# Patient Record
Sex: FEMALE | Race: WHITE | Employment: FULL TIME | URBAN - METROPOLITAN AREA
[De-identification: names, ages, dates, MRNs, and addresses within clinical notes are randomized per-mention and may not be internally consistent; named-entity substitution may affect disease eponyms.]

---

## 2017-03-10 ENCOUNTER — OFFICE VISIT (OUTPATIENT)
Dept: SLEEP MEDICINE | Facility: CLINIC | Age: 42
End: 2017-03-10
Attending: NURSE PRACTITIONER
Payer: COMMERCIAL

## 2017-03-10 VITALS
HEIGHT: 64 IN | WEIGHT: 259.3 LBS | OXYGEN SATURATION: 98 % | SYSTOLIC BLOOD PRESSURE: 176 MMHG | DIASTOLIC BLOOD PRESSURE: 112 MMHG | HEART RATE: 102 BPM | RESPIRATION RATE: 18 BRPM | BODY MASS INDEX: 44.27 KG/M2

## 2017-03-10 DIAGNOSIS — I10 ESSENTIAL HYPERTENSION: ICD-10-CM

## 2017-03-10 DIAGNOSIS — G47.33 OSA (OBSTRUCTIVE SLEEP APNEA): Primary | ICD-10-CM

## 2017-03-10 DIAGNOSIS — G47.21 CIRCADIAN RHYTHM SLEEP DISORDER, DELAYED SLEEP PHASE TYPE: ICD-10-CM

## 2017-03-10 DIAGNOSIS — E66.9 OBESITY (BMI 30-39.9): ICD-10-CM

## 2017-03-10 PROCEDURE — 99211 OFF/OP EST MAY X REQ PHY/QHP: CPT | Mod: ZF

## 2017-03-10 RX ORDER — FLUTICASONE PROPIONATE 50 MCG
2 SPRAY, SUSPENSION (ML) NASAL DAILY
Qty: 48 G | Refills: 3 | Status: SHIPPED | OUTPATIENT
Start: 2017-03-10

## 2017-03-10 NOTE — MR AVS SNAPSHOT
After Visit Summary   3/10/2017    Nohemi Dial    MRN: [id number]           Patient Information     Date Of Birth          1975        Visit Information        Provider Department      3/10/2017 10:00 AM Sona Ramirez APRN [facility], [place], Sleep Study        Today's Diagnoses     BELEN (obstructive sleep apnea)    -  1    Essential hypertension        Obesity (BMI 30-39.9)        Circadian rhythm sleep disorder, delayed sleep phase type          Care Instructions    Supplies and mask fit    Use saline spray product (ocean complete or arm and hammer are easy to use) in shower where nose naturally opens up. Use another time of day with continue congestion over the sink.   flonase 2 sprays/nostril/ day  (gael Gibbs)  Until you are seen again in clinic in the next 12 months, please watch for a return of sleepiness which is usually due to a change in the followin) a decrease in usage of cpap  2) decrease in amount of time slept  3) a poor seal (often a function of not following cleaning recommendations or  replacement guidelines (every 3 -6 months))  4)  increase in weight or use of sedating medications resulting in higher pressure needs     If you have tried to address these issues but are still sleepy, please call for an earlier appointment.    Please, do not drive if sleepy        Your BMI is Body mass index is 44.51 kg/(m^2). lifestyle weight management  Weight management is a personal decision.  If you are interested in exploring weight loss strategies, the following discussion covers the approaches that may be successful. Body mass index (BMI) is one way to tell whether you are at a healthy weight, overweight, or obese. It measures your weight in relation to your height.  A BMI of 18.5 to 24.9 is in the healthy range. A person with a BMI of 25 to 29.9 is considered overweight, and someone with a BMI of 30 or greater is considered obese. More than two-thirds of American  adults are considered overweight or obese.  Being overweight or obese increases the risk for further weight gain. Excess weight may lead to heart disease and diabetes.  Creating and following plans for healthy eating and physical activity may help you improve your health.  Weight control is part of healthy lifestyle and includes exercise, emotional health, and healthy eating habits. Careful eating habits lifelong are the mainstay of weight control. Though there are significant health benefits from weight loss, long-term weight loss with diet alone may be very difficult to achieve- studies show long-term success with dietary management in less than 10% of people. Attaining a healthy weight may be especially difficult to achieve in those with severe obesity. In some cases, medications, devices and surgical management might be considered.  What can you do?  If you are overweight or obese and are interested in methods for weight loss, you should discuss this with your provider.     Consider reducing daily calorie intake by 500 calories.     Keep a food journal.     Avoiding skipping meals, consider cutting portions instead.    Diet combined with exercise helps maintain muscle while optimizing fat loss. Strength training is particularly important for building and maintaining muscle mass. Exercise helps reduce stress, increase energy, and improves fitness. Increasing exercise without diet control, however, may not burn enough calories to loose weight.       Start walking three days a week 10-20 minutes at a time    Work towards walking thirty minutes five days a week     Eventually, increase the speed of your walking for 1-2 minutes at time    In addition, we recommend that you review healthy lifestyles and methods for weight loss available through the National Institutes of Health patient information sites:  http://win.niddk.nih.gov/publications/index.htm    And look into health and wellness programs that may be  available through your health insurance provider, employer, local community center, or haydee club.    Weight management plan: Patient was referred to their PCP to discuss a diet and exercise plan.            Follow-ups after your visit        Additional Services     WEIGHT MANAGEMENT/ [facility] LIFESTYLE PROGRAM REFERRAL       To schedule an appointment, please call the [facility] at [phone].                  Follow-up notes from your care team     Return in about 1 year (around 3/10/2018) for mask fit and supplies with Gerry.      Your next 10 appointments already scheduled     Mar 14, 2017  9:00 AM CDT   SLEEP DME SUPPLIES with DME SCHEDULE   [facility])    [address] [place] [id number] [phone]              Who to contact     If you have questions or need follow up information about today's clinic visit or your schedule please contact [facility] directly at [phone].  Normal or non-critical lab and imaging results will be communicated to you by EUROBOXhart, letter or phone within 4 business days after the clinic has received the results. If you do not hear from us within 7 days, please contact the clinic through [employer] or phone. If you have a critical or abnormal lab result, we will notify you by phone as soon as possible.  Submit refill requests through Microland or call your pharmacy and they will forward the refill request to us. Please allow 3 business days for your refill to be completed.          Additional Information About Your Visit        EUROBOXharEyesquad Information     Microland gives you secure access to your electronic health record. If you see a primary care provider, you can also send messages to your care team and make appointments. If you have questions, please call your primary care clinic.  If you do not have a primary care provider, please call [phone] and they will  "assist you.        Care EveryWhere ID     This is your Care EveryWhere ID. This could be used by other organizations to access your [place] medical records  [id number]        Your Vitals Were     Pulse Respirations Height Last Period Pulse Oximetry BMI (Body Mass Index)    102 18 1.626 m (5' 4\") 02/14/2017 98% 44.51 kg/m2       Blood Pressure from Last 3 Encounters:   03/10/17 (!) 176/112   06/03/15 120/78   10/10/14 132/84    Weight from Last 3 Encounters:   03/10/17 117.6 kg (259 lb 4.8 oz)   06/03/15 117.3 kg (258 lb 9.6 oz)   10/10/14 115.8 kg (255 lb 3.2 oz)              We Performed the Following     [facility]     WEIGHT MANAGEMENT/ [facility] LIFESTYLE PROGRAM REFERRAL          Today's Medication Changes          These changes are accurate as of: 3/10/17  6:42 PM.  If you have any questions, ask your nurse or doctor.               These medicines have changed or have updated prescriptions.        Dose/Directions    fluticasone 50 MCG/ACT spray   Commonly known as:  FLONASE   This may have changed:  how much to take   Changed by:  Sona Ramirez APRN CNP        Dose:  2 spray   Spray 2 sprays into both nostrils daily   Quantity:  48 g   Refills:  3            Where to get your medicines      These medications were sent to [facility] [address] [place] [address] AT [address] [place] [address] & [address] [address]    Hours:  24-hours Phone:  [phone]     fluticasone 50 MCG/ACT spray                Primary Care Provider Office Phone # Fax #    ARELY Greco [phone]       [facility] [address] [facility] [address]        Thank you!     Thank you for choosing [facility]  for your care. Our goal is always to provide you with excellent care. Hearing back from our patients is one way we can continue to improve our services. Please take a few minutes to complete the written survey " that you may receive in the mail after your visit with us. Thank you!             Your Updated Medication List - Protect others around you: Learn how to safely use, store and throw away your medicines at www.disposemymeds.org.          This list is accurate as of: 3/10/17  6:42 PM.  Always use your most recent med list.                   Brand Name Dispense Instructions for use    fluticasone 50 MCG/ACT spray    FLONASE    48 g    Spray 2 sprays into both nostrils daily       NASAL SPRAY NA

## 2017-03-10 NOTE — NURSING NOTE
"Chief Complaint   Patient presents with     Consult     Reestablish care       Initial BP (!) 179/127 (BP Location: Right arm, Patient Position: Chair, Cuff Size: Adult Large)  Pulse 102  Resp 18  Ht 1.626 m (5' 4\")  Wt 117.6 kg (259 lb 4.8 oz)  LMP 02/14/2017  SpO2 98%  BMI 44.51 kg/m2 Estimated body mass index is 44.51 kg/(m^2) as calculated from the following:    Height as of this encounter: 1.626 m (5' 4\").    Weight as of this encounter: 117.6 kg (259 lb 4.8 oz).  Medication Reconciliation: complete   Neck 41cm    Rolando HINTON      "

## 2017-03-10 NOTE — PATIENT INSTRUCTIONS
Supplies and mask fit    Use saline spray product (ocean complete or arm and hammer are easy to use) in shower where nose naturally opens up. Use another time of day with continue congestion over the sink.   flonase 2 sprays/nostril/ day  (gael Gibbs)  Until you are seen again in clinic in the next 12 months, please watch for a return of sleepiness which is usually due to a change in the followin) a decrease in usage of cpap  2) decrease in amount of time slept  3) a poor seal (often a function of not following cleaning recommendations or  replacement guidelines (every 3 -6 months))  4)  increase in weight or use of sedating medications resulting in higher pressure needs     If you have tried to address these issues but are still sleepy, please call for an earlier appointment.    Please, do not drive if sleepy        Your BMI is Body mass index is 44.51 kg/(m^2). lifestyle weight management  Weight management is a personal decision.  If you are interested in exploring weight loss strategies, the following discussion covers the approaches that may be successful. Body mass index (BMI) is one way to tell whether you are at a healthy weight, overweight, or obese. It measures your weight in relation to your height.  A BMI of 18.5 to 24.9 is in the healthy range. A person with a BMI of 25 to 29.9 is considered overweight, and someone with a BMI of 30 or greater is considered obese. More than two-thirds of American adults are considered overweight or obese.  Being overweight or obese increases the risk for further weight gain. Excess weight may lead to heart disease and diabetes.  Creating and following plans for healthy eating and physical activity may help you improve your health.  Weight control is part of healthy lifestyle and includes exercise, emotional health, and healthy eating habits. Careful eating habits lifelong are the mainstay of weight control. Though there are significant health benefits from  weight loss, long-term weight loss with diet alone may be very difficult to achieve- studies show long-term success with dietary management in less than 10% of people. Attaining a healthy weight may be especially difficult to achieve in those with severe obesity. In some cases, medications, devices and surgical management might be considered.  What can you do?  If you are overweight or obese and are interested in methods for weight loss, you should discuss this with your provider.     Consider reducing daily calorie intake by 500 calories.     Keep a food journal.     Avoiding skipping meals, consider cutting portions instead.    Diet combined with exercise helps maintain muscle while optimizing fat loss. Strength training is particularly important for building and maintaining muscle mass. Exercise helps reduce stress, increase energy, and improves fitness. Increasing exercise without diet control, however, may not burn enough calories to loose weight.       Start walking three days a week 10-20 minutes at a time    Work towards walking thirty minutes five days a week     Eventually, increase the speed of your walking for 1-2 minutes at time    In addition, we recommend that you review healthy lifestyles and methods for weight loss available through the National Institutes of Health patient information sites:  http://win.niddk.nih.gov/publications/index.htm    And look into health and wellness programs that may be available through your health insurance provider, employer, local community center, or haydee club.    Weight management plan: Patient was referred to their PCP to discuss a diet and exercise plan.

## 2017-03-11 NOTE — PROGRESS NOTES
"Allergies:    Allergies   Allergen Reactions     No Known Drug Allergies        Medications:    Current Outpatient Prescriptions   Medication Sig Dispense Refill     fluticasone (FLONASE) 50 MCG/ACT nasal spray Spray 1-2 sprays into both nostrils daily 1 Package 11     Oxymetazoline HCl (NASAL SPRAY NA)          Problem List:  Patient Active Problem List    Diagnosis Date Noted     BELEN (obstructive sleep apnea) 04/25/2013     CPAP       Chronic blood loss anemia 02/15/2013     CARDIOVASCULAR SCREENING; LDL GOAL LESS THAN 160 02/10/2010     Uterine myoma 12/11/2008     Anxiety state 02/15/2005     Problem list name updated by automated process. Provider to review          Past Medical/Surgical History:  Past Medical History   Diagnosis Date     Heavy periods      NO ACTIVE PROBLEMS      Sleep apnea      CPAP     Past Surgical History   Procedure Laterality Date     C nonspecific procedure       Partial Amputation right thumb     Operative hysteroscopy with morcellator  1/14/2014     Procedure: OPERATIVE HYSTEROSCOPY WITH MORCELLATOR;  Dilation and Curettage Operative Hysteroscopy with Myosure, Placement of Intra Uterine Device .pelvic exam under anesthesia;  Surgeon: Claire Agustin MD;  Location: [facility] OR     Insert intrauterine device  1/14/2014     Procedure: INSERT INTRAUTERINE DEVICE;;  Surgeon: Claire Agustin MD;  Location: [facility] OR           Physical Examination:  Vitals: BP (!) 176/112 (BP Location: Right arm, Patient Position: Chair, Cuff Size: Adult Large)  Pulse 102  Resp 18  Ht 1.626 m (5' 4\")  Wt 117.6 kg (259 lb 4.8 oz)  LMP 02/14/2017  SpO2 98%  BMI 44.51 kg/m2  BMI= Body mass index is 44.51 kg/(m^2).    Neck Cir (cm): 41 cm    [place] Total Score 4/9/2013   Total score - [place] 16       GENERAL APPEARANCE: healthy, alert and no distress  EYES: Eyes grossly normal to inspection  HENT: oropharynx crowded, uvula elongated, soft palate dependent and nares with mild septal deviation, decrease " flow on R  NECK: thyroid normal to palpation  RESP: lungs clear to auscultation - no rales, rhonchi or wheezes  CV: regular rates and rhythm, normal S1 S2, no S3 or S4 and no murmur, click or rub  Extremities are without clubbing, edema  Musculoskeletal:Moves without difficulty  Mallampati Class: IV.  Tonsillar Stage: 1  hidden by pillars.  Dental: Dentition in good condition.  Good advancement of lower jaw without tmd  Skin: without facial rash

## 2017-03-11 NOTE — PROGRESS NOTES
SLEEP MEDICINE CLINIC       DATE OF SLEEP CLINIC VISIT:  03/10/2017.       LOCATION:  [facility].        CHIEF COMPLAINT:  Ms. Nohemi Dial self-refers for a need for new CPAP prescription and supplies after an absence in our clinic for approximately 4 years due to a change of insurance.      HISTORY OF PRESENT ILLNESS:  Previous sleep study done in 2013 which did show preliminary data of AHI of 10 with low O2 sats down to 64% in REM supine sleep, TCM with only minimal rise.  She was provided with APAP and did use that regularly.  Her [place] Sleepiness Score at baseline was 14/24 and she had some difficulties both with sleep onset and sleep maintenance.  She returns to clinic today.  We are able to download 1 day of use in 2016 going back between 08/06 and 09/04/2016, she used her CPAP then for 4 hours and 38 minutes.  Her residual AHI was 0.9 and the 90th percentile pressure was 10.5.  Her CPAP is set at a minimum EPAP of 7, max of 16 cm of water.      Her sleep schedule is fairly regular with a small degree of disparity between workdays and weekends.  She is a night owl by nature and on weekends bedtime is somewhere between 12:00 and 2:00 a.m. and exiting bed at about 9:00 a.m.  On workdays enters bed at 11:00 p.m., exits at around 7:00 a.m. but sometimes later.  Her work start as early as 10:00 a.m. on nongame days for the [employer] and on game days, she can start work at 2:00 p.m. and go to 1:00 in the morning.  She currently without her CPAP awakens 4-5 times a night, mostly to use the bathroom.  Total sleep time 7-8 hours.  She is napping twice on weekends for about 2-3 hours in the afternoon.  Activities in bed includes some brief reading or computer use.  No RLS, nightmares, headaches or signs of orexin deficiency or difficulty with driving.  She does grind her teeth at times.  Without her CPAP, there is loud snoring, snort or gasping arousals and pauses in her sleep period.   Awakens with a dry throat or mouth and on occasion has problems breathing through her nose.  With CPAP, none of these symptoms are present.      SOCIAL AND PERSONAL HISTORY:  She is single.      SLEEP ENVIRONMENT:  Bed partner snores a bit.  She awakens by her own snoring and then lives on a noisy corner in [facility] [facility].  Family members do have sleep apnea.  May have a glass of wine with dinner, otherwise no significant alcohol, caffeine.      [place] Sleepiness score today is 10/24.  She does report sleepiness has affected her productivity in the afternoons when she is exceptionally tired.  No problems with driving.  Three out of 7 days per week she is troubled by tiredness or fatigue.  Two out of 30 days, low mood.  Her father is going through treatment for cancer.        REVIEW OF SYSTEMS:  A 14-point comprehensive review of systems was completed and negative with the exception of the following:   EAR, NOSE AND THROAT:  Sore throat.  Has untreated sleep apnea on her mind.   NERVOUS SYSTEM:  Occasional headaches.   LUNGS:  Dry cough in the morning due to mucous drying overnight.       Allergies:    Allergies   Allergen Reactions     No Known Drug Allergies        Medications:    Current Outpatient Prescriptions   Medication Sig Dispense Refill     fluticasone (FLONASE) 50 MCG/ACT nasal spray Spray 1-2 sprays into both nostrils daily 1 Package 11     Oxymetazoline HCl (NASAL SPRAY NA)          Problem List:  Patient Active Problem List    Diagnosis Date Noted     BELEN (obstructive sleep apnea) 04/25/2013     CPAP       Chronic blood loss anemia 02/15/2013     CARDIOVASCULAR SCREENING; LDL GOAL LESS THAN 160 02/10/2010     Uterine myoma 12/11/2008     Anxiety state 02/15/2005     Problem list name updated by automated process. Provider to review          Past Medical/Surgical History:  Past Medical History   Diagnosis Date     Heavy periods      NO ACTIVE PROBLEMS      Sleep apnea      CPAP     Past Surgical History  "  Procedure Laterality Date     C nonspecific procedure       Partial Amputation right thumb     Operative hysteroscopy with morcellator  1/14/2014     Procedure: OPERATIVE HYSTEROSCOPY WITH MORCELLATOR;  Dilation and Curettage Operative Hysteroscopy with Myosure, Placement of Intra Uterine Device .pelvic exam under anesthesia;  Surgeon: Claire Agustin MD;  Location: [facility] OR     Insert intrauterine device  1/14/2014     Procedure: INSERT INTRAUTERINE DEVICE;;  Surgeon: Claire Agustin MD;  Location: [facility] OR     Physical Examination:  Vitals: BP (!) 176/112 (BP Location: Right arm, Patient Position: Chair, Cuff Size: Adult Large)  Pulse 102  Resp 18  Ht 1.626 m (5' 4\")  Wt 117.6 kg (259 lb 4.8 oz)  LMP 02/14/2017  SpO2 98%  BMI 44.51 kg/m2  BMI= Body mass index is 44.51 kg/(m^2).    Neck Cir (cm): 41 cm    [place] Total Score 4/9/2013   Total score - [place] 16       GENERAL APPEARANCE: healthy, alert and no distress  EYES: Eyes grossly normal to inspection  HENT: oropharynx crowded, uvula elongated, soft palate dependent and nares with mild septal deviation, decrease flow on R  NECK: thyroid normal to palpation  RESP: lungs clear to auscultation - no rales, rhonchi or wheezes  CV: regular rates and rhythm, normal S1 S2, no S3 or S4 and no murmur, click or rub  Extremities are without clubbing, edema  Musculoskeletal:Moves without difficulty  Mallampati Class: IV.  Tonsillar Stage: 1  hidden by pillars.  Dental: Dentition in good condition.  Good advancement of lower jaw without tmd  Skin: without facial rash     ASSESSMENT AND PLAN:  It is my impression that Ms. Nohemi Dial is very motivated in improving her sleep and the following plan of care has been developed:     1.  Obstructive sleep apnea.  I have written an order for her to have supplies provided and I believe a mask fit.  She is to see me back again in 1 year's time, sooner with any problems.   2.  Hypertension.  I have notified her primary " care.  She is aware that it has been gradually going up but the results today are quite disturbing.   3.  Obesity.  Likely impacting her obstructive sleep apnea and Lifestyle Weight Management was offered and referral was placed.   4.  Delayed sleep phase disorder.  Her current position with its dual start time does seem to fit in nicely with her circadian rhythm.  It is important, however, that she protects her a.m. time off and I will reinforce that when I see her next.   5.  Rhinitis.  I have recommended treatment for that.      Thank you for allowing me to participate in this kind woman's care.      Time spent with patient 60 minutes, of which greater than 50% was spent in counseling, education and coordination of care.         ARELY FERNANDES, CNP             D: 2017 11:01   T: 2017 15:30   MT: [id number]      Name:     AGUSTINA ELIZABETH   MRN:      [id number]        Account:      [id number]   :      1975           Visit Date:   03/10/2017      Document: [id number]

## 2017-03-14 ENCOUNTER — DOCUMENTATION ONLY (OUTPATIENT)
Dept: SLEEP MEDICINE | Facility: CLINIC | Age: 42
End: 2017-03-14
Attending: NURSE PRACTITIONER
Payer: COMMERCIAL

## 2017-03-31 ENCOUNTER — OFFICE VISIT (OUTPATIENT)
Dept: FAMILY MEDICINE | Facility: CLINIC | Age: 42
End: 2017-03-31
Payer: COMMERCIAL

## 2017-03-31 VITALS
TEMPERATURE: 97.9 F | OXYGEN SATURATION: 99 % | SYSTOLIC BLOOD PRESSURE: 160 MMHG | HEIGHT: 64 IN | DIASTOLIC BLOOD PRESSURE: 98 MMHG | HEART RATE: 102 BPM | WEIGHT: 264.4 LBS | RESPIRATION RATE: 16 BRPM | BODY MASS INDEX: 45.14 KG/M2

## 2017-03-31 DIAGNOSIS — E66.01 MORBID OBESITY (H): ICD-10-CM

## 2017-03-31 DIAGNOSIS — R03.0 ELEVATED BLOOD PRESSURE READING WITHOUT DIAGNOSIS OF HYPERTENSION: Primary | ICD-10-CM

## 2017-03-31 PROCEDURE — 80053 COMPREHEN METABOLIC PANEL: CPT | Performed by: NURSE PRACTITIONER

## 2017-03-31 PROCEDURE — 80061 LIPID PANEL: CPT | Performed by: NURSE PRACTITIONER

## 2017-03-31 PROCEDURE — 84443 ASSAY THYROID STIM HORMONE: CPT | Performed by: NURSE PRACTITIONER

## 2017-03-31 PROCEDURE — 99213 OFFICE O/P EST LOW 20 MIN: CPT | Performed by: NURSE PRACTITIONER

## 2017-03-31 PROCEDURE — 36415 COLL VENOUS BLD VENIPUNCTURE: CPT | Performed by: NURSE PRACTITIONER

## 2017-03-31 NOTE — PROGRESS NOTES
SUBJECTIVE:                                                    Nohemi Dial is a 42 year old female who presents to clinic today for the following health issues:    Hypertension Follow-up    Outpatient blood pressures are not being checked.    Low Salt Diet: No added salt    She denies any current chest pain, headaches, lightheadedness, or shortness of breath. She would prefer to avoid medications at this time and continue monitoring it every 6 months, indicating she will try to exercise more often and watch her diet more closely. When asked, she has been drinking more alcohol since the 1st of the year, but only 4-5 drinks per week, which mostly consists of wine.  BP Readings from Last 3 Encounters:   03/31/17 (!) 160/98   03/10/17 (!) 176/112   06/03/15 120/78           Amount of exercise or physical activity: Moderate     Problems taking medications regularly: Not prescribed hypertension medication.     Medication side effects: Not applicable    Diet: Regular (no restrictions)      Wt Readings from Last 4 Encounters:   03/31/17 264 lb 6.4 oz (119.9 kg)   03/10/17 259 lb 4.8 oz (117.6 kg)   06/03/15 258 lb 9.6 oz (117.3 kg)   10/10/14 255 lb 3.2 oz (115.8 kg)       Sleep Apnea:  The patient reports she was not using her CPAP machine for the past year and a half due to insurance coverage issues, but started using it again one week and a half ago.      Problem list and histories reviewed & adjusted, as indicated.  Additional history: as documented    Labs reviewed in EPIC    Reviewed and updated as needed this visit by clinical staff  Tobacco  Allergies  Meds  Problems  Med Hx  Surg Hx  Fam Hx  Soc Hx        Reviewed and updated as needed this visit by Provider  Allergies  Meds  Problems         Patient Active Problem List   Diagnosis     Anxiety state     Uterine myoma     CARDIOVASCULAR SCREENING; LDL GOAL LESS THAN 160     Chronic blood loss anemia     BELEN (obstructive sleep apnea)     Morbid  obesity (H)     Past Surgical History:   Procedure Laterality Date     C NONSPECIFIC PROCEDURE      Partial Amputation right thumb     INSERT INTRAUTERINE DEVICE  1/14/2014    Procedure: INSERT INTRAUTERINE DEVICE;;  Surgeon: Claire Agustin MD;  Location: [facility] OR     OPERATIVE HYSTEROSCOPY WITH MORCELLATOR  1/14/2014    Procedure: OPERATIVE HYSTEROSCOPY WITH MORCELLATOR;  Dilation and Curettage Operative Hysteroscopy with Myosure, Placement of Intra Uterine Device .pelvic exam under anesthesia;  Surgeon: Claire Agustin MD;  Location: [facility] OR       Social History   Substance Use Topics     Smoking status: Former Smoker     Types: Cigarettes     Quit date: 1/9/2000     Smokeless tobacco: Never Used     Alcohol use 3.0 oz/week     6 Standard drinks or equivalent per week      Comment: 3/day on wknds     Family History   Problem Relation Age of Onset     Gynecology Mother      Fibroids, hysterectomy     DIABETES Mother      CANCER Father      Non-hodgkins lymphoma/kidney     CANCER Paternal Grandfather      Blood clots     Neurologic Disorder Paternal Grandfather      Parkinsons     HEART DISEASE Sister      Mitral Valve Prolapse         REVIEW OF SYSTEMS:  C: NEGATIVE for fever, chills, or change in weight  R: Hx of Obstructive Sleep Apnea - use of CPAP Machine; NEGATIVE for significant cough or SOB  CV: POSITIVE for Hypertension; NEGATIVE for chest pain, palpitations or peripheral edema  P: Hx of Anxiety; NEGATIVE for changes in mood or affect    This document serves as a record of the services and decisions personally performed and made by Elsa Peacock NP. It was created on her behalf by Tanisha Ybarra, a trained medical scribe. The creation of this document is based on the provider's statements to the medical scribe.  Tanisha Ybarra, March 24, 2017, 3:24 PM      OBJECTIVE:                                                    BP (!) 160/98 (BP Location: Right arm, Patient Position: Chair, Cuff Size:  "Adult Large)  Pulse 102  Temp 97.9  F (36.6  C) (Oral)  Resp 16  Ht 5' 4\" (1.626 m)  Wt 264 lb 6.4 oz (119.9 kg)  LMP 02/14/2017  SpO2 99%  BMI 45.38 kg/m2  Body mass index is 45.38 kg/(m^2).    EXAM:  GENERAL: Patient appears healthy, alert and not distressed.  PSYCH: Mentation appears normal, affect is normal/bright  Remainder of exam deferred.   Lungs clear bilaterally.  S1 S2 without murmur gallop or rub      Diagnostic Test Results:  No results found for this or any previous visit (from the past 24 hour(s)).      ASSESSMENT/PLAN:                                                        ICD-10-CM    1. Elevated blood pressure reading without diagnosis of hypertension R03.0 Comprehensive metabolic panel     TSH with free T4 reflex     Lipid Profile   2. Morbid obesity (H) E66.01        Declines any medications.  Would like to control BP \"naturally\"  Would like watchful waiting for 6 months.  As there are no symptoms of end organ issues with BP can watch as pt restarts CPAP and works with diet and exercise.  Patient Instructions   LAB:  Stop by the lab on your way out to complete some blood work.    FOLLOW-UP:  Please schedule a follow-up appointment in 3 months to recheck your blood pressure and to review the results of your labs. Keeping active and monitoring your diet closely will help to naturally lower your blood pressure prior to our follow-up appointment.       The information in this document, created by a medical scribe for me, accurately reflects the services I personally performed and the decisions made by me. I have reviewed and approved this document for accuracy.  Elsa Peacock, March 31, 2017, 3:31 PM     [facility]  "

## 2017-03-31 NOTE — PATIENT INSTRUCTIONS
LAB:  Stop by the lab on your way out to complete some blood work.    FOLLOW-UP:  Please schedule a follow-up appointment in 3 months to recheck your blood pressure and to review the results of your labs. Keeping active and monitoring your diet closely will help to naturally lower your blood pressure prior to our follow-up appointment.

## 2017-03-31 NOTE — LETTER
April 17, 2017      Nohemi Dial  [address]  [facility] [address]        Nohemi,    Here are your labs.  Overall they are within normal limits.  Please let us know what questions you have.    Thuy Peacock

## 2017-03-31 NOTE — MR AVS SNAPSHOT
"              After Visit Summary   3/31/2017    Nohemi Dial    MRN: [id number]           Patient Information     Date Of Birth          1975        Visit Information        Provider Department      3/31/2017 2:20 PM Elsa Peacock APRN CNP [facility]        Today's Diagnoses     Elevated blood pressure reading without diagnosis of hypertension    -  1      Care Instructions    LAB:  Stop by the lab on your way out to complete some blood work.    FOLLOW-UP:  Please schedule a follow-up appointment in 3 months to recheck your blood pressure and to review the results of your labs. Keeping active and monitoring your diet closely will help to naturally lower your blood pressure prior to our follow-up appointment.           Follow-ups after your visit        Follow-up notes from your care team     Return in about 3 months (around 6/30/2017) for BP Recheck.      Who to contact     If you have questions or need follow up information about today's clinic visit or your schedule please contact [facility] directly at [phone].  Normal or non-critical lab and imaging results will be communicated to you by [employer], letter or phone within 4 business days after the clinic has received the results. If you do not hear from us within 7 days, please contact the clinic through [employer] or phone. If you have a critical or abnormal lab result, we will notify you by phone as soon as possible.  Submit refill requests through [employer] or call your pharmacy and they will forward the refill request to us. Please allow 3 business days for your refill to be completed.          Additional Information About Your Visit        [employer] retargetingharAirbnb Information     [employer] lets you send messages to your doctor, view your test results, renew your prescriptions, schedule appointments and more. To sign up, go to [email or web address] . Click on \"Log in\" on the left side of the screen, which will take you to the " "Welcome page. Then click on \"Sign up Now\" on the right side of the page.     You will be asked to enter the access code listed below, as well as some personal information. Please follow the directions to create your username and password.     Your access code is: [email or web address]  Expires: 2017  3:31 PM     Your access code will  in 90 days. If you need help or a new code, please call your [facility] clinic or [phone].        Care EveryWhere ID     This is your Care EveryWhere ID. This could be used by other organizations to access your [facility] medical records  [id number]        Your Vitals Were     Pulse Temperature Respirations Height Last Period Pulse Oximetry    102 97.9  F (36.6  C) (Oral) 16 5' 4\" (1.626 m) 2017 99%    BMI (Body Mass Index)                   45.38 kg/m2            Blood Pressure from Last 3 Encounters:   17 (!) 160/98   03/10/17 (!) 176/112   06/03/15 120/78    Weight from Last 3 Encounters:   17 264 lb 6.4 oz (119.9 kg)   03/10/17 259 lb 4.8 oz (117.6 kg)   06/03/15 258 lb 9.6 oz (117.3 kg)              We Performed the Following     Comprehensive metabolic panel     Lipid Profile     TSH with free T4 reflex        Primary Care Provider Office Phone # Fax #    Elsa Peacock APRDALJIT [phone]       [facility] [address] [facility] [address]        Thank you!     Thank you for choosing [facility]  for your care. Our goal is always to provide you with excellent care. Hearing back from our patients is one way we can continue to improve our services. Please take a few minutes to complete the written survey that you may receive in the mail after your visit with us. Thank you!             Your Updated Medication List - Protect others around you: Learn how to safely use, store and throw away your medicines at www.disposemymeds.org.          This list is accurate as of: 3/31/17  3:31 PM.  Always use your most " recent med list.                   Brand Name Dispense Instructions for use    fluticasone 50 MCG/ACT spray    FLONASE    48 g    Spray 2 sprays into both nostrils daily       NASAL SPRAY NA

## 2017-04-01 LAB
ALBUMIN SERPL-MCNC: 3.3 G/DL (ref 3.4–5)
ALP SERPL-CCNC: 48 U/L (ref 40–150)
ALT SERPL W P-5'-P-CCNC: 14 U/L (ref 0–50)
ANION GAP SERPL CALCULATED.3IONS-SCNC: 8 MMOL/L (ref 3–14)
AST SERPL W P-5'-P-CCNC: 9 U/L (ref 0–45)
BILIRUB SERPL-MCNC: 0.3 MG/DL (ref 0.2–1.3)
BUN SERPL-MCNC: 10 MG/DL (ref 7–30)
CALCIUM SERPL-MCNC: 8.6 MG/DL (ref 8.5–10.1)
CHLORIDE SERPL-SCNC: 106 MMOL/L (ref 94–109)
CHOLEST SERPL-MCNC: 148 MG/DL
CO2 SERPL-SCNC: 25 MMOL/L (ref 20–32)
CREAT SERPL-MCNC: 0.75 MG/DL (ref 0.52–1.04)
GFR SERPL CREATININE-BSD FRML MDRD: 86 ML/MIN/1.7M2
GLUCOSE SERPL-MCNC: 94 MG/DL (ref 70–99)
HDLC SERPL-MCNC: 46 MG/DL
LDLC SERPL CALC-MCNC: 68 MG/DL
NONHDLC SERPL-MCNC: 102 MG/DL
POTASSIUM SERPL-SCNC: 4.2 MMOL/L (ref 3.4–5.3)
PROT SERPL-MCNC: 6.6 G/DL (ref 6.8–8.8)
SODIUM SERPL-SCNC: 139 MMOL/L (ref 133–144)
TRIGL SERPL-MCNC: 169 MG/DL
TSH SERPL DL<=0.005 MIU/L-ACNC: 1.64 MU/L (ref 0.4–4)

## 2017-04-04 PROBLEM — E66.01 MORBID OBESITY (H): Status: ACTIVE | Noted: 2017-04-04

## 2017-05-03 ENCOUNTER — TELEPHONE (OUTPATIENT)
Dept: FAMILY MEDICINE | Facility: CLINIC | Age: 42
End: 2017-05-03

## 2017-05-03 NOTE — TELEPHONE ENCOUNTER
Panel Management Review      Patient has the following on her problem list: None      Composite cancer screening  Chart review shows that this patient is due/due soon for the following Pap Smear  Summary:    Patient is due/failing the following:   PAP and PHYSICAL    Action needed:   Patient needs office visit for physical and pap smear.    Type of outreach:    Phone, left message for patient to call back.     Questions for provider review:    None                                                                                                                                    Sejal Medeiros MA

## 2017-05-03 NOTE — LETTER
May 17, 2017      Nohemi Dial  [address]  [facility] [address]        Dear Nohemi,     According to our records, you are due for a physical and a pap smear to screen for cervical cancer.     Please contact the clinic at [phone] at your earliest convenience to schedule this appointment.     Thank you for choosing [facility] as your preferred healthcare.     Sincerely,     Elsa Peacock CNP, RN

## 2017-05-10 NOTE — TELEPHONE ENCOUNTER
Second attempt to contact patient.    Called pt and LM to return call to clinic to schedule appt.   Sejal Medeiros MA

## 2017-08-08 ENCOUNTER — TELEPHONE (OUTPATIENT)
Dept: FAMILY MEDICINE | Facility: CLINIC | Age: 42
End: 2017-08-08

## 2017-08-17 NOTE — TELEPHONE ENCOUNTER
Sent letter informing that patient is due for a physical and pap.   Sejal Medeiros MA      Post-Care Instructions: I reviewed with the patient in detail post-care instructions. Patient is to wear sunprotection, and avoid picking at any of the treated lesions. Pt may apply Vaseline to crusted or scabbing areas. Render Post-Care Instructions In Note?: no Detail Level: Detailed Consent: The patient's consent was obtained including but not limited to risks of crusting, scabbing, blistering, scarring, darker or lighter pigmentary change, recurrence, incomplete removal and infection. Duration Of Freeze Thaw-Cycle (Seconds): 2 Number Of Freeze-Thaw Cycles: 1 freeze-thaw cycle

## 2017-12-05 ENCOUNTER — TELEPHONE (OUTPATIENT)
Dept: FAMILY MEDICINE | Facility: CLINIC | Age: 42
End: 2017-12-05

## 2017-12-05 NOTE — TELEPHONE ENCOUNTER
Panel Management Review      Patient has the following on her problem list: None      Composite cancer screening  Chart review shows that this patient is due/due soon for the following Pap Smear  Summary:    Patient is due/failing the following:   PAP and PHYSICAL    Action needed:   Patient needs office visit for physical and pap smear.    Type of outreach:    None, outreach scheduling attempted to contact patient within the last 3 months.     Questions for provider review:    None                                                                                                                                    Sejal Medeiros MA        Chart Closed.

## 2018-01-20 ENCOUNTER — TELEPHONE (OUTPATIENT)
Dept: FAMILY MEDICINE | Facility: CLINIC | Age: 43
End: 2018-01-20

## 2018-03-08 ENCOUNTER — TRANSFERRED RECORDS (OUTPATIENT)
Dept: HEALTH INFORMATION MANAGEMENT | Facility: CLINIC | Age: 43
End: 2018-03-08

## 2018-03-21 ENCOUNTER — TELEPHONE (OUTPATIENT)
Dept: FAMILY MEDICINE | Facility: CLINIC | Age: 43
End: 2018-03-21

## 2018-03-21 NOTE — TELEPHONE ENCOUNTER
Panel Management Review      Patient has the following on her problem list: None      Composite cancer screening  Chart review shows that this patient is due/due soon for the following Pap Smear  Summary:    Patient is due/failing the following:   PHQ9    Action needed:   Patient needs office visit for Physical and pap.    Type of outreach:    Phone, left message for patient to call back.     Questions for provider review:    None                                                                                                                                    Danni Coronado CMA       Chart routed to Care Team .

## 2018-03-21 NOTE — LETTER
[facility]  [address] [facility] [address] [phone]       April 23, 2018    Nohemi Dial  [address]  [facility] [address]    Dear Nohemi,    We care about your health and have reviewed your health plan and are making recommendations based on this review, to optimize your health.    You are in particular need of attention regarding:  -Cervical Cancer Screening  -Wellness (Physical) Visit     We are recommending that you:                                                                           -schedule a WELLNESS (Physical) APPOINTMENT. We will check fasting labs the same day. You should have nothing to eat except water and meds for 8-10 hours prior.                                                                                                                         -schedule a PAP SMEAR EXAM which is due.  Please disregard this reminder if you have had this exam elsewhere within the last year.  It would be helpful for us to have a copy of your recent pap smear report in our file so that we can best coordinate your care.    If you are under/uninsured, we recommend you contact the [facility]. They offer pap smears at no charge or on a sliding fee charge. You can schedule with them at [phone]. Please have them send us the results.                                                                                                In addition, here is a list of due or overdue Health Maintenance reminders.    Health Maintenance Due   Topic Date Due     HIV SCREEN (SYSTEM ASSIGNED)  03/03/1995     Pap Smear - every 3 years  02/15/2016     Flu Vaccine (1) 09/01/2017       To address the above recommendations, we encourage you to contact us at [phone], via [employer] or by contacting Central Scheduling toll free at [phone] 24 hours a day. They will assist you with finding the most convenient time and location.    Thank you for trusting  [facility] and we appreciate the opportunity to serve you.  We look forward to supporting your healthcare needs in the future.    Healthy Regards,    Your [facility] Team

## 2023-10-14 NOTE — Clinical Note
Does she need to meet compliance with return to cpap?  Do I need follow up within 30-90d? Sona
Patient to follow up with Primary Care provider regarding elevated blood pressure.
Ca 7.5 most recent BMP, albumin on previous CMP 2.4, corrected Ca 8.8  - CTM Ca levels, replete as needed